# Patient Record
Sex: MALE | Race: BLACK OR AFRICAN AMERICAN | Employment: UNEMPLOYED | URBAN - METROPOLITAN AREA
[De-identification: names, ages, dates, MRNs, and addresses within clinical notes are randomized per-mention and may not be internally consistent; named-entity substitution may affect disease eponyms.]

---

## 2020-03-16 PROBLEM — D86.89 SARCOIDOSIS OF OTHER SITES: Status: ACTIVE | Noted: 2018-11-15

## 2020-11-01 ENCOUNTER — HOSPITAL ENCOUNTER (OUTPATIENT)
Dept: ULTRASOUND IMAGING | Age: 50
Discharge: HOME | End: 2020-11-01
Attending: INTERNAL MEDICINE
Payer: COMMERCIAL

## 2020-11-01 DIAGNOSIS — R79.89 HIGH CREATININE: ICD-10-CM

## 2020-11-01 PROCEDURE — 76775 US EXAM ABDO BACK WALL LIM: CPT | Performed by: INTERNAL MEDICINE

## 2020-11-05 NOTE — PROGRESS NOTES
Denis Bates call this patient and let him know his Kidney US came back normal. Pls have him schedule an appointment to see Dr. Brodie Marcos so she can recheck his blood pressure and his kidney function (CMP) this does not require fasting.      Once informed, rubi

## 2020-11-06 NOTE — PROGRESS NOTES
Patient informed and verbalized understanding. Pt already has an blane scheduled for Monday at 11:30 at the [facility] location.

## 2022-05-10 ENCOUNTER — OFFICE VISIT (OUTPATIENT)
Dept: NEPHROLOGY | Facility: CLINIC | Age: 52
End: 2022-05-10
Payer: COMMERCIAL

## 2022-05-10 VITALS — WEIGHT: 187.5 LBS | SYSTOLIC BLOOD PRESSURE: 108 MMHG | DIASTOLIC BLOOD PRESSURE: 62 MMHG | BODY MASS INDEX: 25 KG/M2

## 2022-05-10 DIAGNOSIS — R79.89 ELEVATED SERUM CREATININE: Primary | ICD-10-CM

## 2022-05-10 PROCEDURE — 99204 OFFICE O/P NEW MOD 45 MIN: CPT | Performed by: INTERNAL MEDICINE

## 2022-05-10 PROCEDURE — 3074F SYST BP LT 130 MM HG: CPT | Performed by: INTERNAL MEDICINE

## 2022-05-10 PROCEDURE — 3078F DIAST BP <80 MM HG: CPT | Performed by: INTERNAL MEDICINE

## 2022-05-16 LAB
CYSTATIN C: 0.99 MG/L (ref 0.52–1.27)
EGFR: 82 ML/MIN/1.73M2

## 2022-05-19 ENCOUNTER — TELEPHONE (OUTPATIENT)
Dept: NEPHROLOGY | Facility: CLINIC | Age: 52
End: 2022-05-19

## 2024-09-06 ENCOUNTER — HOSPITAL ENCOUNTER (OUTPATIENT)
Dept: GENERAL RADIOLOGY | Age: 54
Discharge: HOME | End: 2024-09-06
Attending: INTERNAL MEDICINE
Payer: COMMERCIAL

## 2024-09-06 ENCOUNTER — LAB ENCOUNTER (OUTPATIENT)
Dept: LAB | Age: 54
End: 2024-09-06
Attending: INTERNAL MEDICINE
Payer: COMMERCIAL

## 2024-09-06 DIAGNOSIS — E55.9 VITAMIN D DEFICIENCY: ICD-10-CM

## 2024-09-06 DIAGNOSIS — R73.9 HYPERGLYCEMIA: ICD-10-CM

## 2024-09-06 DIAGNOSIS — Z12.5 SCREENING FOR PROSTATE CANCER: ICD-10-CM

## 2024-09-06 DIAGNOSIS — D64.9 ANEMIA, UNSPECIFIED TYPE: ICD-10-CM

## 2024-09-06 DIAGNOSIS — Z86.2 HISTORY OF SARCOIDOSIS: ICD-10-CM

## 2024-09-06 DIAGNOSIS — R35.1 NOCTURIA: ICD-10-CM

## 2024-09-06 DIAGNOSIS — E03.9 PRIMARY HYPOTHYROIDISM: ICD-10-CM

## 2024-09-06 DIAGNOSIS — E78.2 MIXED HYPERLIPIDEMIA: ICD-10-CM

## 2024-09-06 DIAGNOSIS — Z00.00 ROUTINE GENERAL MEDICAL EXAMINATION AT A HEALTH CARE FACILITY: ICD-10-CM

## 2024-09-06 LAB
ALBUMIN SERPL-MCNC: 4.3 G/DL (ref 3.2–4.8)
ALBUMIN/GLOB SERPL: 1.3 {RATIO} (ref 1–2)
ALP LIVER SERPL-CCNC: 67 U/L
ALT SERPL-CCNC: 24 U/L
ANION GAP SERPL CALC-SCNC: 7 MMOL/L (ref 0–18)
AST SERPL-CCNC: 22 U/L (ref ?–34)
BASOPHILS # BLD AUTO: 0.03 X10(3) UL (ref 0–0.2)
BASOPHILS NFR BLD AUTO: 0.7 %
BILIRUB SERPL-MCNC: 0.6 MG/DL (ref 0.3–1.2)
BUN BLD-MCNC: 13 MG/DL (ref 9–23)
CALCIUM BLD-MCNC: 10 MG/DL (ref 8.7–10.4)
CHLORIDE SERPL-SCNC: 104 MMOL/L (ref 98–112)
CHOLEST SERPL-MCNC: 286 MG/DL (ref ?–200)
CO2 SERPL-SCNC: 28 MMOL/L (ref 21–32)
CREAT BLD-MCNC: 1.51 MG/DL
EGFRCR SERPLBLD CKD-EPI 2021: 55 ML/MIN/1.73M2 (ref 60–?)
EOSINOPHIL # BLD AUTO: 0.06 X10(3) UL (ref 0–0.7)
EOSINOPHIL NFR BLD AUTO: 1.4 %
ERYTHROCYTE [DISTWIDTH] IN BLOOD BY AUTOMATED COUNT: 14.1 %
EST. AVERAGE GLUCOSE BLD GHB EST-MCNC: 131 MG/DL (ref 68–126)
FASTING PATIENT LIPID ANSWER: YES
FASTING STATUS PATIENT QL REPORTED: YES
GLOBULIN PLAS-MCNC: 3.3 G/DL (ref 2–3.5)
GLUCOSE BLD-MCNC: 87 MG/DL (ref 70–99)
HBA1C MFR BLD: 6.2 % (ref ?–5.7)
HCT VFR BLD AUTO: 46.4 %
HDLC SERPL-MCNC: 48 MG/DL (ref 40–59)
HGB BLD-MCNC: 15.5 G/DL
IMM GRANULOCYTES # BLD AUTO: 0.01 X10(3) UL (ref 0–1)
IMM GRANULOCYTES NFR BLD: 0.2 %
LDLC SERPL CALC-MCNC: 211 MG/DL (ref ?–100)
LYMPHOCYTES # BLD AUTO: 1.54 X10(3) UL (ref 1–4)
LYMPHOCYTES NFR BLD AUTO: 37.2 %
MCH RBC QN AUTO: 29 PG (ref 26–34)
MCHC RBC AUTO-ENTMCNC: 33.4 G/DL (ref 31–37)
MCV RBC AUTO: 86.9 FL
MONOCYTES # BLD AUTO: 0.52 X10(3) UL (ref 0.1–1)
MONOCYTES NFR BLD AUTO: 12.6 %
NEUTROPHILS # BLD AUTO: 1.98 X10 (3) UL (ref 1.5–7.7)
NEUTROPHILS # BLD AUTO: 1.98 X10(3) UL (ref 1.5–7.7)
NEUTROPHILS NFR BLD AUTO: 47.9 %
NONHDLC SERPL-MCNC: 238 MG/DL (ref ?–130)
OSMOLALITY SERPL CALC.SUM OF ELEC: 287 MOSM/KG (ref 275–295)
PLATELET # BLD AUTO: 181 10(3)UL (ref 150–450)
POTASSIUM SERPL-SCNC: 4.2 MMOL/L (ref 3.5–5.1)
PROT SERPL-MCNC: 7.6 G/DL (ref 5.7–8.2)
PSA SERPL-MCNC: 0.62 NG/ML (ref ?–4)
RBC # BLD AUTO: 5.34 X10(6)UL
SODIUM SERPL-SCNC: 139 MMOL/L (ref 136–145)
TRIGL SERPL-MCNC: 147 MG/DL (ref 30–149)
TSI SER-ACNC: 2.88 MIU/ML (ref 0.55–4.78)
VIT D+METAB SERPL-MCNC: 14.7 NG/ML (ref 30–100)
VLDLC SERPL CALC-MCNC: 32 MG/DL (ref 0–30)
WBC # BLD AUTO: 4.1 X10(3) UL (ref 4–11)

## 2024-09-06 PROCEDURE — 85025 COMPLETE CBC W/AUTO DIFF WBC: CPT

## 2024-09-06 PROCEDURE — 71046 X-RAY EXAM CHEST 2 VIEWS: CPT | Performed by: INTERNAL MEDICINE

## 2024-09-06 PROCEDURE — 86038 ANTINUCLEAR ANTIBODIES: CPT

## 2024-09-06 PROCEDURE — 83516 IMMUNOASSAY NONANTIBODY: CPT

## 2024-09-06 PROCEDURE — 84153 ASSAY OF PSA TOTAL: CPT

## 2024-09-06 PROCEDURE — 84443 ASSAY THYROID STIM HORMONE: CPT

## 2024-09-06 PROCEDURE — 80061 LIPID PANEL: CPT

## 2024-09-06 PROCEDURE — 36415 COLL VENOUS BLD VENIPUNCTURE: CPT

## 2024-09-06 PROCEDURE — 80053 COMPREHEN METABOLIC PANEL: CPT

## 2024-09-06 PROCEDURE — 86037 ANCA TITER EACH ANTIBODY: CPT

## 2024-09-06 PROCEDURE — 82306 VITAMIN D 25 HYDROXY: CPT

## 2024-09-06 PROCEDURE — 83036 HEMOGLOBIN GLYCOSYLATED A1C: CPT

## 2024-09-10 LAB
ANTI-MPO ANTIBODIES: <0.2 UNITS
ANTI-PR3 ANTIBODIES: <0.2 UNITS
NUCLEAR IGG TITR SER IF: NEGATIVE {TITER}

## 2025-03-21 ENCOUNTER — HOSPITAL ENCOUNTER (OUTPATIENT)
Dept: CT IMAGING | Age: 55
Discharge: HOME | End: 2025-03-21
Attending: INTERNAL MEDICINE
Payer: COMMERCIAL

## 2025-03-21 ENCOUNTER — LAB ENCOUNTER (OUTPATIENT)
Dept: LAB | Age: 55
End: 2025-03-21
Attending: INTERNAL MEDICINE
Payer: COMMERCIAL

## 2025-03-21 DIAGNOSIS — Z00.00 ROUTINE GENERAL MEDICAL EXAMINATION AT A HEALTH CARE FACILITY: ICD-10-CM

## 2025-03-21 DIAGNOSIS — D64.9 ANEMIA, UNSPECIFIED TYPE: ICD-10-CM

## 2025-03-21 DIAGNOSIS — D86.9 SARCOIDOSIS: ICD-10-CM

## 2025-03-21 DIAGNOSIS — R73.9 BLOOD GLUCOSE ELEVATED: ICD-10-CM

## 2025-03-21 DIAGNOSIS — E03.9 PRIMARY HYPOTHYROIDISM: ICD-10-CM

## 2025-03-21 DIAGNOSIS — E78.5 HYPERLIPIDEMIA, UNSPECIFIED HYPERLIPIDEMIA TYPE: ICD-10-CM

## 2025-03-21 LAB
ALBUMIN SERPL-MCNC: 4.5 G/DL (ref 3.2–4.8)
ALBUMIN/GLOB SERPL: 1.5 {RATIO} (ref 1–2)
ALP LIVER SERPL-CCNC: 62 U/L
ALT SERPL-CCNC: 24 U/L
ANION GAP SERPL CALC-SCNC: 16 MMOL/L (ref 0–18)
AST SERPL-CCNC: 23 U/L (ref ?–34)
BASOPHILS # BLD AUTO: 0.04 X10(3) UL (ref 0–0.2)
BASOPHILS NFR BLD AUTO: 1 %
BILIRUB SERPL-MCNC: 0.6 MG/DL (ref 0.3–1.2)
BUN BLD-MCNC: 13 MG/DL (ref 9–23)
CALCIUM BLD-MCNC: 9.9 MG/DL (ref 8.7–10.6)
CHLORIDE SERPL-SCNC: 102 MMOL/L (ref 98–112)
CHOLEST SERPL-MCNC: 295 MG/DL (ref ?–200)
CO2 SERPL-SCNC: 22 MMOL/L (ref 21–32)
CREAT BLD-MCNC: 1.54 MG/DL
EGFRCR SERPLBLD CKD-EPI 2021: 54 ML/MIN/1.73M2 (ref 60–?)
EOSINOPHIL # BLD AUTO: 0.07 X10(3) UL (ref 0–0.7)
EOSINOPHIL NFR BLD AUTO: 1.7 %
ERYTHROCYTE [DISTWIDTH] IN BLOOD BY AUTOMATED COUNT: 13.6 %
EST. AVERAGE GLUCOSE BLD GHB EST-MCNC: 131 MG/DL (ref 68–126)
FASTING PATIENT LIPID ANSWER: YES
FASTING STATUS PATIENT QL REPORTED: YES
GLOBULIN PLAS-MCNC: 3 G/DL (ref 2–3.5)
GLUCOSE BLD-MCNC: 88 MG/DL (ref 70–99)
HBA1C MFR BLD: 6.2 % (ref ?–5.7)
HCT VFR BLD AUTO: 48 %
HDLC SERPL-MCNC: 52 MG/DL (ref 40–59)
HGB BLD-MCNC: 16.1 G/DL
IMM GRANULOCYTES # BLD AUTO: 0.01 X10(3) UL (ref 0–1)
IMM GRANULOCYTES NFR BLD: 0.2 %
LDLC SERPL CALC-MCNC: 218 MG/DL (ref ?–100)
LYMPHOCYTES # BLD AUTO: 1.74 X10(3) UL (ref 1–4)
LYMPHOCYTES NFR BLD AUTO: 41.5 %
MCH RBC QN AUTO: 29 PG (ref 26–34)
MCHC RBC AUTO-ENTMCNC: 33.5 G/DL (ref 31–37)
MCV RBC AUTO: 86.5 FL
MONOCYTES # BLD AUTO: 0.47 X10(3) UL (ref 0.1–1)
MONOCYTES NFR BLD AUTO: 11.2 %
NEUTROPHILS # BLD AUTO: 1.86 X10 (3) UL (ref 1.5–7.7)
NEUTROPHILS # BLD AUTO: 1.86 X10(3) UL (ref 1.5–7.7)
NEUTROPHILS NFR BLD AUTO: 44.4 %
NONHDLC SERPL-MCNC: 243 MG/DL (ref ?–130)
OSMOLALITY SERPL CALC.SUM OF ELEC: 290 MOSM/KG (ref 275–295)
PLATELET # BLD AUTO: 159 10(3)UL (ref 150–450)
POTASSIUM SERPL-SCNC: 4.2 MMOL/L (ref 3.5–5.1)
PROT SERPL-MCNC: 7.5 G/DL (ref 5.7–8.2)
RBC # BLD AUTO: 5.55 X10(6)UL
SODIUM SERPL-SCNC: 140 MMOL/L (ref 136–145)
TRIGL SERPL-MCNC: 137 MG/DL (ref 30–149)
TSI SER-ACNC: 2.29 UIU/ML (ref 0.55–4.78)
VLDLC SERPL CALC-MCNC: 30 MG/DL (ref 0–30)
WBC # BLD AUTO: 4.2 X10(3) UL (ref 4–11)

## 2025-03-21 PROCEDURE — 85025 COMPLETE CBC W/AUTO DIFF WBC: CPT

## 2025-03-21 PROCEDURE — 36415 COLL VENOUS BLD VENIPUNCTURE: CPT

## 2025-03-21 PROCEDURE — 83036 HEMOGLOBIN GLYCOSYLATED A1C: CPT

## 2025-03-21 PROCEDURE — 80061 LIPID PANEL: CPT

## 2025-03-21 PROCEDURE — 80053 COMPREHEN METABOLIC PANEL: CPT

## 2025-03-21 PROCEDURE — 84443 ASSAY THYROID STIM HORMONE: CPT

## 2025-03-21 PROCEDURE — 71250 CT THORAX DX C-: CPT | Performed by: INTERNAL MEDICINE

## 2025-04-10 ENCOUNTER — HOSPITAL ENCOUNTER (OUTPATIENT)
Dept: GENERAL RADIOLOGY | Age: 55
Discharge: HOME | End: 2025-04-10
Attending: INTERNAL MEDICINE
Payer: COMMERCIAL

## 2025-04-10 DIAGNOSIS — M25.562 ACUTE PAIN OF LEFT KNEE: ICD-10-CM

## 2025-04-10 PROCEDURE — 73562 X-RAY EXAM OF KNEE 3: CPT | Performed by: INTERNAL MEDICINE

## 2025-05-04 ENCOUNTER — HOSPITAL ENCOUNTER (OUTPATIENT)
Dept: MRI IMAGING | Age: 55
End: 2025-05-04
Attending: INTERNAL MEDICINE
Payer: COMMERCIAL

## 2025-05-04 ENCOUNTER — HOSPITAL ENCOUNTER (OUTPATIENT)
Dept: MRI IMAGING | Age: 55
Discharge: HOME | End: 2025-05-04
Attending: INTERNAL MEDICINE
Payer: COMMERCIAL

## 2025-05-04 DIAGNOSIS — M25.562 ACUTE PAIN OF LEFT KNEE: ICD-10-CM

## 2025-05-04 PROCEDURE — 73721 MRI JNT OF LWR EXTRE W/O DYE: CPT | Performed by: INTERNAL MEDICINE

## 2025-05-06 PROBLEM — D12.2 BENIGN NEOPLASM OF ASCENDING COLON: Status: ACTIVE | Noted: 2025-05-06

## 2025-05-06 PROBLEM — D12.3 BENIGN NEOPLASM OF TRANSVERSE COLON: Status: ACTIVE | Noted: 2025-05-06

## 2025-05-06 PROBLEM — Z12.11 SPECIAL SCREENING FOR MALIGNANT NEOPLASMS, COLON: Status: ACTIVE | Noted: 2025-05-06

## 2025-05-06 PROCEDURE — 88305 TISSUE EXAM BY PATHOLOGIST: CPT | Performed by: INTERNAL MEDICINE

## 2025-05-07 NOTE — PROGRESS NOTES
Attempted to call patient, LVM.    Sent telephone encounter to [place] to work on ortho referral since patient has [employer]    Will try again later this week.  Keeping encounter open.

## 2025-05-09 ENCOUNTER — TELEPHONE (OUTPATIENT)
Dept: ORTHOPEDICS CLINIC | Facility: CLINIC | Age: 55
End: 2025-05-09

## 2025-05-09 NOTE — TELEPHONE ENCOUNTER
Patient has an appointment scheduled with Dr. Hackett on 5/21/25 for left knee pain. Please advise if imaging is needed prior.

## 2025-05-09 NOTE — PROGRESS NOTES
Patient informed and verbalized understanding.    Referred to Dr. Hackett-- patient was driving when I spoke to him and requested I send a [employer] message w/ scheduling information for Dr. Hackett-- sent message.    Closing encounter.

## 2025-05-09 NOTE — TELEPHONE ENCOUNTER
Recent Xrays and recent MRI - both in EMR- please advise if new xrays are needed.    Xray~  Results  XR KNEE (3 VIEWS), LEFT (CPT=73562) [[id number]] (Accession [id number]) (Order [id number])  PACS Images     Show images for XR KNEE (3 VIEWS), LEFT (CPT=73562) Printable Result Report    Open Hard Copy Result Report (Order #[id number] - XR KNEE (3 VIEWS), LEFT (CPT=73562))      Study Result    Narrative   PROCEDURE:  XR KNEE ROUTINE (3 VIEWS), LEFT (CPT=73562)     TECHNIQUE:  Three views were obtained including patellar view.     COMPARISON:  None.     INDICATIONS:     PATIENT STATED HISTORY: (As transcribed by Technologist)  Patient states pain to medial anterior aspect of knee after sudden extension of knee 1 week ago. Patient says pain worsens when bending the knee.          FINDINGS:    BONES:  Normal.  No significant arthropathy or acute abnormality.  SOFT TISSUES:  Negative.  No visible soft tissue swelling.  EFFUSION:  Moderate joint effusion is noted in suprapatellar region.  OTHER:  Negative.                   Impression   CONCLUSION:  There is a moderate joint effusion.  There is no fracture.        LOCATION:  [facility]        Dictated by (CST): Lester Lilly MD on 4/10/2025 at 3:48 PM      Finalized by (CST): Lester Lilly MD on 4/10/2025 at 3:48 PM         MRI     Results  MRI KNEE, LEFT (RYV=27722) [[id number]] (Accession [id number]) (Order [id number])  PACS Images     Show images for MRI KNEE, LEFT (TQX=49262) Printable Result Report    Open Hard Copy Result Report (Order #[id number] - MRI KNEE, LEFT (VZQ=01148))      Study Result    Narrative   PROCEDURE:  MRI KNEE, LEFT (KON=83513)     COMPARISON:  None.     INDICATIONS:  M25.562 Acute pain of left knee     TECHNIQUE:  Axial, coronal, and sagittal proton density with and without fat saturation images were obtained.     PATIENT STATED HISTORY: (As transcribed by Technologist)  Patient has pain above the patella in the left knee.         FINDINGS:    LIGAMENTS:           The ACL, PCL, patellar retinacula, and collateral ligament complexes are intact.  MENISCI:            The medial and lateral menisci are intact without evidence of tear or significant degenerative change.  TENDONS:            The tendinous insertions about the knee are intact without significant tendinosis or tears.  MUSCULATURE:        No evidence of strain, edema, or atrophy.  BONY COMPARTMENTS:  No fracture or malalignment.  Joint spaces are preserved.  Articular cartilage preserved in the medial and lateral compartments.  Fissuring and partial thickness chondral loss of the central femoral trochlea.  Patellar cartilage is  preserved.  SYNOVIUM:           Small joint effusion with mild synovial thickening of the suprapatellar recess.  No intra-articular bodies.  No organized Baker cyst.  Mild edema and dorsal convex margin of the suprapatellar fat pad (series 603, image 8) features  suggesting fat pad impingement.  The popliteal neurovascular bundle is within normal limits.                      Impression   CONCLUSION:       1. Normal menisci.     2. Normal cruciate and collateral ligaments.     3. Fissuring and partial thickness chondral loss of the central femoral trochlea.     4. Mild edema and dorsal convex margin of the suprapatellar fat pad, features suggesting fat pad impingement.  This may be secondary to trauma or patellar maltracking.  Additionally noted small knee joint effusion with mild synovial thickening of the  suprapatellar recess indicating mild synovitis.          LOCATION:  [id number]                   Dictated by (CST): Alisson Parekh MD on 5/04/2025 at 9:58 PM      Finalized by (CST): Alisson Parekh MD on 5/04/2025 at 10:04 PM

## 2025-05-19 ENCOUNTER — OFFICE VISIT (OUTPATIENT)
Facility: CLINIC | Age: 55
End: 2025-05-19
Payer: COMMERCIAL

## 2025-05-19 VITALS
BODY MASS INDEX: 26.41 KG/M2 | HEART RATE: 83 BPM | WEIGHT: 195 LBS | RESPIRATION RATE: 16 BRPM | HEIGHT: 72 IN | OXYGEN SATURATION: 94 %

## 2025-05-19 DIAGNOSIS — R59.0 HILAR LYMPHADENOPATHY: ICD-10-CM

## 2025-05-19 DIAGNOSIS — J40 BRONCHITIS: Primary | ICD-10-CM

## 2025-05-19 RX ORDER — ALBUTEROL SULFATE AND BUDESONIDE 90; 80 UG/1; UG/1
2 AEROSOL, METERED RESPIRATORY (INHALATION) EVERY 6 HOURS PRN
Qty: 10.7 G | Refills: 10 | Status: SHIPPED | OUTPATIENT
Start: 2025-05-19

## 2025-05-19 NOTE — PROGRESS NOTES
The following individual(s) verbally consented to be recorded using ambient AI listening technology and understand that they can each withdraw their consent to this listening technology at any point by asking the clinician to turn off or pause the recording:    Patient name: Swapnil Pierre  Additional names:

## 2025-05-19 NOTE — PROGRESS NOTES
[facility] General Pulmonary Consult Note    Chief Complaint:  Chief Complaint   Patient presents with    New Patient     Ref by PCP for CT 3/21 and chronic bronchitis        History of Present Illness:  History of Present Illness  Swapnil Pierre is a 54 year old male who presents with bronchitis and lymphadenopathy. He was referred by Dr. Zarate for evaluation of bronchitis and lymphadenopathy.    He was diagnosed with bronchitis after experiencing sudden sweating and a sensation of heavy air while walking on a warm day. 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 No current symptoms such as cough, chest tightness, or wheezing have been reported since the initial episode.    He has a history of breathing difficulties and heavy snoring, which prompted a consultation at [facility]. His wife noted the onset of heavy snoring and mucus production. A CT scan on March 21st revealed lymph nodes in his chest measuring 1.6 x 1.2 cm and 1.3 x 1.2 cm. He was prescribed medication and Flonase, which improved his breathing.    No history of smoking or asthma. No symptoms such as fever, night sweats, or weight loss.      Past Medical History:   Past Medical History[1]     Past Surgical History: Past Surgical History[2]    Family Medical History: Family History[3]     Social History:   Social History     Socioeconomic History    Marital status:      Spouse name: Not on file    Number of children: Not on file    Years of education: Not on file    Highest education level: Not on file   Occupational History    Not on file   Tobacco Use    Smoking status: Never     Passive exposure: Never    Smokeless tobacco: Never   Vaping Use    Vaping status: Never Used   Substance and Sexual Activity    Alcohol use: Yes     Alcohol/week: 3.0 standard drinks of alcohol     Types: 3 Cans of beer per week     Comment: 1-2 drinks per week    Drug use: Never    Sexual  activity: Yes     Partners: Female   Other Topics Concern    Caffeine Concern Not Asked    Exercise Not Asked    Seat Belt Not Asked    Special Diet Not Asked    Stress Concern Not Asked    Weight Concern Not Asked   Social History Narrative    Not on file     Social Drivers of Health     Food Insecurity: Not on file   Transportation Needs: Not on file   Stress: Not on file   Housing Stability: Not on file        Allergies: Dust mite extract     Medications: Current Medications[4]    Review of Systems: Review of Systems    Physical Exam:  Pulse 83   Resp 16   Ht 6' (1.829 m)   Wt 195 lb (88.5 kg)   SpO2 94%   BMI 26.45 kg/m²      Constitutional: alert, cooperative. No acute distress.  HEENT: Head NC/AT. Nares normal. Septum midline. Mucosa normal. No drainage or sinus tenderness.. Mallampati 2+  Cardio: Regular rate and rhythm. Normal S1 and S2. No murmurs.   Respiratory: Thorax symmetrical with no labored breathing. clear to auscultation bilaterally  GI: NABS. Abd soft, non-tender.  Extremities: No clubbing or cyanosis. No BLE edema.    Neurologic: A&Ox3. No gross motor deficits.  Skin: Warm, dry  Psych: Calm, cooperative. Pleasant affect.    Results:  Images personally reviewed - my own review dictated as below  Results  RADIOLOGY  Chest CT: Lymph node 1.6 x 1.2 cm, Lymph node 1.3 x 1.2 cm (03/21/2025)  WBC: 4.2, done on 3/21/2025.  HGB: 16.1, done on 3/21/2025.  PLT: 159, done on 3/21/2025.     Glucose: 88, done on 3/21/2025.  Cr: 1.54, done on 3/21/2025.  Last eGFR was 54 on 3/21/2025.  CA: 9.9, done on 3/21/2025.  Na: 140, done on 3/21/2025.  K: 4.2, done on 3/21/2025.  Cl: 102, done on 3/21/2025.  CO2: 22, done on 3/21/2025.  Last ALB was 4.5% done on 3/21/2025.     MRI KNEE, LEFT (LKO=52592)  Result Date: 5/4/2025  CONCLUSION:   1. Normal menisci.  2. Normal cruciate and collateral ligaments.  3. Fissuring and partial thickness chondral loss of the central femoral trochlea.  4. Mild edema and dorsal  convex margin of the suprapatellar fat pad, features suggesting fat pad impingement.  This may be secondary to trauma or patellar maltracking.  Additionally noted small knee joint effusion with mild synovial thickening of the suprapatellar recess indicating mild synovitis.    LOCATION:  [id number]          Dictated by (CST): Alisson Parekh MD on 5/04/2025 at 9:58 PM     Finalized by (CST): Alisson Parekh MD on 5/04/2025 at 10:04 PM       XR KNEE (3 VIEWS), LEFT (CPT=73562)  Result Date: 4/10/2025  CONCLUSION:  There is a moderate joint effusion.  There is no fracture.   LOCATION:  [facility]   Dictated by (CST): Lester Lilly MD on 4/10/2025 at 3:48 PM     Finalized by (CST): Lester Lilly MD on 4/10/2025 at 3:48 PM       CT CHEST HI RESOLUTION (CPT=71250)  Result Date: 3/21/2025  CONCLUSION:   Numerous pleural-based nodules throughout the lungs are noted.  Calcified lymph nodes and overall mild mediastinal lymphadenopathy is noted.  This likely related patient's history of sarcoidosis.     LOCATION:  [facility]    Dictated by (CST): Dariusz Gore MD on 3/21/2025 at 6:27 PM     Finalized by (CST): Dariusz Gore MD on 3/21/2025 at 6:31 PM          Assessment/Plan:  Assessment & Plan  Bronchitis  Likely related to environmental exposure at work. No current symptoms. Previous episode resolved without recurrence.  - Prescribed airsupra for use as needed if symptoms return.    Enlarged lymph nodes  Enlarged lymph nodes in chest on CT scan, slightly above normal size, no associated symptoms.  - Order repeat CT scan in six months to monitor lymph node size.        Return in about 6 months (around 11/19/2025).    Kali Matias MD  5/19/2025         [1]   Past Medical History:   Allergic rhinitis   [2]   Past Surgical History:  Procedure Laterality Date    Hernia surgery      Vasectomy     [3]   Family History  Problem Relation Age of Onset    Hypertension Mother     Hypertension Brother    [4]   Current Outpatient  Medications   Medication Sig Dispense Refill    Albuterol-Budesonide (AIRSUPRA) 90-80 MCG/ACT Inhalation Aerosol Inhale 2 puffs into the lungs every 6 (six) hours as needed. 10.7 g 10    PEG 3350-KCl-Na Bicarb-NaCl 420 g Oral Recon Soln Take as directed by physician. 4000 mL 0    celecoxib (CELEBREX) 200 MG Oral Cap Take one tablet in AM and PM for one week then take one daily. 45 capsule 0    rosuvastatin 10 MG Oral Tab Take 1 tablet (10 mg total) by mouth nightly. 90 tablet 0    Fluticasone Propionate (XHANCE) 93 MCG/ACT Nasal Exhaler Suspension 1 puff by Nasal route in the morning and 1 puff before bedtime.      ergocalciferol 1.25 MG (64728 UT) Oral Cap Take 1 capsule (50,000 Units total) by mouth once a week. 12 capsule 3

## 2025-05-21 ENCOUNTER — OFFICE VISIT (OUTPATIENT)
Dept: ORTHOPEDICS CLINIC | Facility: CLINIC | Age: 55
End: 2025-05-21
Payer: COMMERCIAL

## 2025-05-21 VITALS — BODY MASS INDEX: 26.41 KG/M2 | WEIGHT: 195 LBS | HEIGHT: 72 IN

## 2025-05-21 DIAGNOSIS — M25.862 IMPINGEMENT SYNDROME INVOLVING PATELLAR FAT PAD OF LEFT KNEE: Primary | ICD-10-CM

## 2025-05-21 DIAGNOSIS — M22.42 CHONDROMALACIA OF LEFT PATELLA: ICD-10-CM

## 2025-05-21 PROCEDURE — 3008F BODY MASS INDEX DOCD: CPT | Performed by: ORTHOPAEDIC SURGERY

## 2025-05-21 PROCEDURE — 99204 OFFICE O/P NEW MOD 45 MIN: CPT | Performed by: ORTHOPAEDIC SURGERY

## 2025-05-21 NOTE — PROGRESS NOTES
formerly [facility] New Patient Consult         The following individual(s) verbally consented to be recorded using ambient AI listening technology and understand that they can each withdraw their consent to this listening technology at any point by asking the clinician to turn off or pause the recording:    Patient name: Swapnil Pierre    Chief Complaint   Patient presents with    Knee Pain     LEFT KNEE  -Denies any injections or physical therapy     History of Present Illness  Swapnil Pierre is a 54 year old male who presents at the request of Dr. Max Zarate with new onset left knee pain. Approximately one month ago, he experienced a needling-like pain in the front of his left knee, specifically in the quadriceps area, after squatting and getting up while playing with some children.  Initially, he was able to walk without pain, but the discomfort became apparent during certain activities. 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 He has had to adjust his seat to alleviate the discomfort by sitting further back and periodically stretching his leg. The pain is particularly noticeable when performing activities that involve bending the knee, such as squatting or lunging, and he experiences a popping sensation when stretching the knee out after sitting. He was prescribed Celebrex 200 mg for pain management and underwent an MRI. He has not had any surgeries or injections in the knee. He has not played basketball for three to four years, and he weighs approximately 200 pounds. No catching or locking of the knee, and there is no history of knee surgery or injections.    Past Medical History[1]  Past Surgical History[2]  Current Medications[3]  Allergies[4]  Family History[5]  Social History     Occupational History    Not on file   Tobacco Use    Smoking  status: Never     Passive exposure: Never    Smokeless tobacco: Never   Vaping Use    Vaping status: Never Used   Substance and Sexual Activity    Alcohol use: Yes     Alcohol/week: 3.0 standard drinks of alcohol     Types: 3 Cans of beer per week     Comment: 1-2 drinks per week    Drug use: Never    Sexual activity: Yes     Partners: Female        ROS:  Complete ROS reviewed by me and non-contributory to the chief complaint except as mentioned above.    Physical Exam:    Ht 6' (1.829 m)   Wt 195 lb (88.5 kg)   BMI 26.45 kg/m²   Constitutional: Well developed, well nourished 54 year old male  Psychological: NAD, alert and appropriate  Respiratory: Breathing comfortably on room air with RR of 12-14  Cardiac: Palpable distal pulses with pink warm extremities distally  Evaluation of the knees reveals neutral alignment with nonantalgic gait.  There is no visible swelling or discoloration.  Palpation reveals no warmth or effusion.  Range of motion reveals adequate full extension and 125 degrees of flexion.  There is mild patellofemoral click and crepitus during early flexion and late extension with a negative patellofemoral grind test.  Medial and lateral retinacular tissues are minimally tender.  Tibiofemoral joint lines are nontender.  Both Jayden's and Steinmann's tests are negative.  Varus, valgus, anterior and posterior stress tests are negative including a negative Lachman test.  No significant distal foot and ankle edema is noted.  Neurovascular status is intact distally.    Imaging: Multiple views of the left knee personally viewed, independently interpreted and radiology report read.  No acute bony findings are noted.  Joint spaces are preserved.    MRI left knee obtained 5/4/2025 reveals chondromalacia at the central trochlea, edema at the suprapatellar fat pad consistent with impingement.  Mild synovitis is noted.    MRI KNEE, LEFT (WNX=92921)  Result Date: 5/4/2025  CONCLUSION:   1. Normal menisci.  2.  Normal cruciate and collateral ligaments.  3. Fissuring and partial thickness chondral loss of the central femoral trochlea.  4. Mild edema and dorsal convex margin of the suprapatellar fat pad, features suggesting fat pad impingement.  This may be secondary to trauma or patellar maltracking.  Additionally noted small knee joint effusion with mild synovial thickening of the suprapatellar recess indicating mild synovitis.    LOCATION:  [id number]          Dictated by (CST): Alisson Parekh MD on 5/04/2025 at 9:58 PM     Finalized by (CST): Alisson Parekh MD on 5/04/2025 at 10:04 PM       XR KNEE (3 VIEWS), LEFT (CPT=73562)  Result Date: 4/10/2025  CONCLUSION:  There is a moderate joint effusion.  There is no fracture.   LOCATION:  [facility]   Dictated by (CST): Lester Lilly MD on 4/10/2025 at 3:48 PM     Finalized by (CST): Lester Lilly MD on 4/10/2025 at 3:48 PM         Assessment/Diagnoses:  Diagnoses and all orders for this visit:    Impingement syndrome involving patellar fat pad of left knee    Chondromalacia of left patella      Assessment & Plan  Suprapatellar fat pad impingement  Suprapatellar fat pad impingement in the left knee, likely due to recent activities involving squatting and bending. MRI shows inflammation of the suprapatellar fat pad and mild synovial impingement. No significant arthritis or bone abnormalities. Symptoms include needling pain, stiffness, and discomfort during prolonged sitting and certain movements. Condition is expected to improve with conservative management over 6-8 weeks. Prolonged sitting with knee flexion and activities like squatting, lunging, and descending stairs exacerbate symptoms. Conservative management is preferred, with surgical intervention as a last resort if symptoms persist.  - Advise rest and avoidance of activities that exacerbate symptoms, such as squatting, lunging, and descending stairs.  - Recommend continued use of naproxen and icing for pain management.  - Instruct  to adjust seating position to reduce knee flexion during prolonged sitting.  - Schedule follow-up in 6-8 weeks if symptoms do not improve.    Simona Hackett MD, FAAOS  Orthopaedic Surgery   Sports Medicine/Knee and Shoulder  [facility]  t: [phone]  f: [phone]           This document was partially prepared using Dragon Medical voice recognition software.  Although every attempt is made to correct errors during dictation, discrepancies may still exist.           [1]   Past Medical History:   Allergic rhinitis   [2]   Past Surgical History:  Procedure Laterality Date    Hernia surgery      Vasectomy     [3]   Current Outpatient Medications   Medication Sig Dispense Refill    Albuterol-Budesonide (AIRSUPRA) 90-80 MCG/ACT Inhalation Aerosol Inhale 2 puffs into the lungs every 6 (six) hours as needed. 10.7 g 10    rosuvastatin 10 MG Oral Tab Take 1 tablet (10 mg total) by mouth nightly. 90 tablet 0    Fluticasone Propionate (XHANCE) 93 MCG/ACT Nasal Exhaler Suspension 1 puff by Nasal route in the morning and 1 puff before bedtime.      ergocalciferol 1.25 MG (16698 UT) Oral Cap Take 1 capsule (50,000 Units total) by mouth once a week. 12 capsule 3    celecoxib (CELEBREX) 200 MG Oral Cap Take one tablet in AM and PM for one week then take one daily. (Patient not taking: Reported on 5/21/2025) 45 capsule 0   [4]   Allergies  Allergen Reactions    Dust Mite Extract Runny nose     Grass, dirt mites, weeds   [5]   Family History  Problem Relation Age of Onset    Hypertension Mother     Hypertension Brother

## 2025-05-28 ENCOUNTER — TELEPHONE (OUTPATIENT)
Facility: CLINIC | Age: 55
End: 2025-05-28

## 2025-05-28 DIAGNOSIS — R06.02 SHORTNESS OF BREATH: Primary | ICD-10-CM

## 2025-06-02 RX ORDER — ALBUTEROL SULFATE 90 UG/1
2 INHALANT RESPIRATORY (INHALATION) EVERY 6 HOURS PRN
Qty: 1 EACH | Refills: 0 | Status: SHIPPED | OUTPATIENT
Start: 2025-06-02

## 2025-06-02 NOTE — TELEPHONE ENCOUNTER
Received response to coverage exception from [employer].  Air Supra was denied due to:  \"You must have a condition that is supported in compendia for the requested drug.\"    Message forwarded to Dr. Matias and RADHA Bell.

## 2025-06-05 ENCOUNTER — MED REC SCAN ONLY (OUTPATIENT)
Facility: CLINIC | Age: 55
End: 2025-06-05